# Patient Record
Sex: FEMALE | Race: BLACK OR AFRICAN AMERICAN | NOT HISPANIC OR LATINO | Employment: OTHER | URBAN - METROPOLITAN AREA
[De-identification: names, ages, dates, MRNs, and addresses within clinical notes are randomized per-mention and may not be internally consistent; named-entity substitution may affect disease eponyms.]

---

## 2017-01-12 ENCOUNTER — TELEPHONE (OUTPATIENT)
Dept: FAMILY MEDICINE CLINIC | Facility: CLINIC | Age: 67
End: 2017-01-12

## 2017-01-12 DIAGNOSIS — R92.8 ABNORMAL MAMMOGRAM: Primary | ICD-10-CM

## 2017-01-12 NOTE — TELEPHONE ENCOUNTER
Dr. Ruiz requesting bilateral ultrasound of breast  Surgery contact Dai [phone]    Megan spoke to radiology, three-way call with Yanna  She understands regarding the ultrasound and follow-up with results    Unfortunately despite great effort we are unable to reach her regarding her abnormal test in September and need for further evaluation        Abnormal mammogram September 2016, requiring further evaluation and unable to reach patient until today ,despite multiple phone calls letter's and certified mail

## 2017-06-06 ENCOUNTER — TELEPHONE (OUTPATIENT)
Dept: FAMILY MEDICINE CLINIC | Facility: CLINIC | Age: 67
End: 2017-06-06

## 2017-06-06 NOTE — TELEPHONE ENCOUNTER
Spoke with patient and she has not been contact with anyone. I advised her that it would be best if she made an appointment to come in and be seen. She is schedule for Libby 15 @ 9am

## 2017-06-07 NOTE — TELEPHONE ENCOUNTER
This message is for both of us, to make sure we have good communication  For any reason patient counseled appointment  Please notify me Libby 15 if patient does not come in to the office for any reason  Outstanding workup required regarding abnormal mammogram    Thank you

## 2017-06-15 ENCOUNTER — OFFICE VISIT (OUTPATIENT)
Dept: FAMILY MEDICINE CLINIC | Facility: CLINIC | Age: 67
End: 2017-06-15

## 2017-06-15 VITALS
OXYGEN SATURATION: 98 % | HEIGHT: 67 IN | BODY MASS INDEX: 34.06 KG/M2 | DIASTOLIC BLOOD PRESSURE: 80 MMHG | WEIGHT: 217 LBS | HEART RATE: 100 BPM | TEMPERATURE: 98.1 F | SYSTOLIC BLOOD PRESSURE: 138 MMHG

## 2017-06-15 DIAGNOSIS — M25.552 LEFT HIP PAIN: ICD-10-CM

## 2017-06-15 DIAGNOSIS — I10 ESSENTIAL HYPERTENSION: ICD-10-CM

## 2017-06-15 DIAGNOSIS — R92.8 ABNORMAL MAMMOGRAM OF BOTH BREASTS: Primary | ICD-10-CM

## 2017-06-15 DIAGNOSIS — L30.9 DERMATITIS: ICD-10-CM

## 2017-06-15 DIAGNOSIS — G89.29 CHRONIC MIDLINE LOW BACK PAIN WITHOUT SCIATICA: ICD-10-CM

## 2017-06-15 DIAGNOSIS — M54.50 CHRONIC MIDLINE LOW BACK PAIN WITHOUT SCIATICA: ICD-10-CM

## 2017-06-15 PROCEDURE — 99214 OFFICE O/P EST MOD 30 MIN: CPT | Performed by: NURSE PRACTITIONER

## 2017-06-15 RX ORDER — METHYLPREDNISOLONE 4 MG/1
TABLET ORAL
Qty: 21 TABLET | Refills: 0 | Status: SHIPPED | OUTPATIENT
Start: 2017-06-15

## 2017-06-15 RX ORDER — AMLODIPINE BESYLATE 10 MG/1
10 TABLET ORAL DAILY
Qty: 90 TABLET | Refills: 3 | Status: SHIPPED | OUTPATIENT
Start: 2017-06-15 | End: 2018-09-18 | Stop reason: SDUPTHER

## 2017-06-15 RX ORDER — DULOXETIN HYDROCHLORIDE 30 MG/1
30 CAPSULE, DELAYED RELEASE ORAL
Qty: 30 CAPSULE | Refills: 1 | Status: SHIPPED | OUTPATIENT
Start: 2017-06-15

## 2017-06-15 RX ORDER — LISINOPRIL 40 MG/1
40 TABLET ORAL DAILY
Qty: 90 TABLET | Refills: 1 | Status: SHIPPED | OUTPATIENT
Start: 2017-06-15 | End: 2018-05-24 | Stop reason: SDUPTHER

## 2017-06-15 RX ORDER — CELECOXIB 200 MG/1
200 CAPSULE ORAL DAILY
Qty: 30 CAPSULE | Refills: 5 | Status: SHIPPED | OUTPATIENT
Start: 2017-06-15

## 2017-06-15 NOTE — PROGRESS NOTES
Subjective   Yanna Montano is a 66 y.o. female.     HPI Comments: Patient's here for follow-up  She had an abnormal mammogram last fall  She avoided phone calls for quite a while think she had some anxiety and likely some depression  Likely she is here today she is known about the abnormal mammogram    She has problems sleeping  Goes asleep but wakes early    Depression screen  15  PH Q9    No bipolar disease    Blood pressures controlled    Has chronic low back pain chronic left hip pain  No history of malignancy no cancer  Limits her activities    Dermatitis rash upper extremities itches quite a bit can stop scratching  Not sun related  Had some last year on her neck now mostly her upper arms and similar for it  Someone her low back  No history psoriasis or autoimmune disease         The following portions of the patient's history were reviewed and updated as appropriate: allergies, current medications, past medical history, past social history, past surgical history and problem list.    Review of Systems   Constitutional: Positive for fever.   HENT: Negative.    Eyes: Negative.    Respiratory: Negative.    Cardiovascular: Negative.    Gastrointestinal: Negative.    Genitourinary: Negative.    Musculoskeletal: Positive for back pain.   Skin: Positive for rash.   Neurological: Negative.    Psychiatric/Behavioral: The patient is nervous/anxious.         Depression       Objective   Physical Exam   Constitutional: She is oriented to person, place, and time. She appears well-developed and well-nourished.   HENT:   Head: Normocephalic.   Nose: Nose normal.   Mouth/Throat: Oropharynx is clear and moist.   Tm clear susi no mass canal patent without d/c   Eyes: Conjunctivae are normal. Pupils are equal, round, and reactive to light. No scleral icterus.   Neck: Neck supple. No JVD present. No thyromegaly present.   Cardiovascular: Normal rate, regular rhythm and normal heart sounds.  Exam reveals no gallop and no friction  rub.    No murmur heard.  Pulmonary/Chest: Effort normal and breath sounds normal. No stridor. No respiratory distress. She has no wheezes. She has no rales.   Abdominal: Soft. Bowel sounds are normal. She exhibits no distension. There is no tenderness.   No hepatosplenomegaly, no ascites,   Musculoskeletal: She exhibits no edema or tenderness.   Lymphadenopathy:     She has no cervical adenopathy.   Neurological: She is alert and oriented to person, place, and time. She has normal reflexes.   Skin: Skin is warm and dry. No rash noted. No erythema.   Bilateral multiple excoriated papule  Approximately 10-12 each forearm obvious scratching    Dry scabs mid back without plaque or demarcation to suggest psoriasis\    Right upper forehead more discrete slightly erythemic papules     Psychiatric: She has a normal mood and affect. Her behavior is normal. Judgment and thought content normal.   Vitals reviewed.      Assessment/Plan   Yanna was seen today for follow-up and med refill.    Diagnoses and all orders for this visit:    Abnormal mammogram of both breasts  -     US breast bilateral complete    Dermatitis  Comments:  Likely eczema, or atypical psoriasis    Essential hypertension    Left hip pain  -     Ambulatory Referral to Physical Therapy  -     XR Hip With or Without Pelvis 2 - 3 View Left    Chronic midline low back pain without sciatica  -     Ambulatory Referral to Physical Therapy    Other orders  -     triamcinolone (KENALOG) 0.1 % ointment; Apply  topically 2 (Two) Times a Day. To arms, low back until clear avoid face or chronic use,  -     lisinopril (PRINIVIL,ZESTRIL) 40 MG tablet; Take 1 tablet by mouth Daily.  -     amLODIPine (NORVASC) 10 MG tablet; Take 1 tablet by mouth Daily.  -     hydrocortisone 2.5 % cream; Apply  topically 2 (Two) Times a Day. Apply sparingly to frontal scalp, upper forehead twice daily, avoid face maximum use 12 weeks.  -     MethylPREDNISolone (MEDROL, DUYEN,) 4 MG tablet;  follow package directions  -     celecoxib (CELEBREX) 200 MG capsule; Take 1 capsule by mouth Daily. With food and large glass of water daily for chronic pain  -     DULoxetine (CYMBALTA) 30 MG capsule; Take 1 capsule by mouth every night at bedtime. Chronic mild depression and anxiety                Patient agrees to get the ultrasound for breast  Follow-up with results me personally  She will see dermatology if her dermatitis does not improve  Risk-benefit duloxetine stop for any problems seek urgent treatment  Sleep hygiene    Discussed risk benefit NSAID including potential increased risk of gastric bleeding stroke heart attack elevated blood pressure kidney failure  Lowest effective dose shortest time possible, proper hydration to decrease risk of renal failure, take with food as well minimize risk.  Requires routine lab monitoring as well.  Patient accepts risk and understands risk benefit, I have answered all patient's questions thoroughly.  If gastric upset, stop the medication, if coffee-ground emesis black tarry stools abdominal pain, no immediately stop the medication and seek immediate medical attention.  If initially started on steroid, patient understands complete steroid, prior to starting any NSAID to avoid potential drug interactions.      Discharge instructions    Ointment for your arms as directed    Cream for your 4 head as directed    Duloxetine 30 mg at bedtime for anxiety depression, it may help you sleep as well?    Sleep hygiene    Physical therapy    Medrol Dosepak start now  When finished start generic Celebrex 200 mg daily for chronic aches and pains  These medications may take several weeks  2 start to notice a difference so please be patient    Please call me in one week regarding your duloxetine  I will increase the dose after 1-2 weeks    Recheck in the office in 2 weeks    And generally takes 4-6 weeks for duloxetine full effect  Reschedule your ultrasound of your breast  Encourage  you to try not to worry about this    I scheduled quite a few things that don't want to overwhelm you      Thank You,

## 2017-06-15 NOTE — PATIENT INSTRUCTIONS
Discharge instructions    Ointment for your arms as directed    Cream for your 4 head as directed    Duloxetine 30 mg at bedtime for anxiety depression, it may help you sleep as well?    Sleep hygiene    Physical therapy    Medrol Dosepak start now  When finished start generic Celebrex 200 mg daily for chronic aches and pains  These medications may take several weeks  2 start to notice a difference so please be patient    Please call me in one week regarding your duloxetine  I will increase the dose after 1-2 weeks    Recheck in the office in 2 weeks    And generally takes 4-6 weeks for duloxetine full effect  Reschedule your ultrasound of your breast  Encourage you to try not to worry about this    I scheduled quite a few things that don't want to overwhelm you      Thank You,      James Epley, NPInsomnia  Insomnia is a sleep disorder that makes it difficult to fall asleep or to stay asleep. Insomnia can cause tiredness (fatigue), low energy, difficulty concentrating, mood swings, and poor performance at work or school.   There are three different ways to classify insomnia:   · Difficulty falling asleep.  · Difficulty staying asleep.  · Waking up too early in the morning.  Any type of insomnia can be long-term (chronic) or short-term (acute). Both are common. Short-term insomnia usually lasts for three months or less. Chronic insomnia occurs at least three times a week for longer than three months.  CAUSES   Insomnia may be caused by another condition, situation, or substance, such as:  · Anxiety.  · Certain medicines.  · Gastroesophageal reflux disease (GERD) or other gastrointestinal conditions.  · Asthma or other breathing conditions.  · Restless legs syndrome, sleep apnea, or other sleep disorders.  · Chronic pain.  · Menopause. This may include hot flashes.  · Stroke.  · Abuse of alcohol, tobacco, or illegal drugs.  · Depression.  · Caffeine.    · Neurological disorders, such as Alzheimer disease.  · An  overactive thyroid (hyperthyroidism).  The cause of insomnia may not be known.  RISK FACTORS  Risk factors for insomnia include:  · Gender. Women are more commonly affected than men.  · Age. Insomnia is more common as you get older.  · Stress. This may involve your professional or personal life.  · Income. Insomnia is more common in people with lower income.  · Lack of exercise.    · Irregular work schedule or night shifts.  · Traveling between different time zones.  SIGNS AND SYMPTOMS  If you have insomnia, trouble falling asleep or trouble staying asleep is the main symptom. This may lead to other symptoms, such as:  · Feeling fatigued.  · Feeling nervous about going to sleep.  · Not feeling rested in the morning.  · Having trouble concentrating.  · Feeling irritable, anxious, or depressed.  TREATMENT   Treatment for insomnia depends on the cause. If your insomnia is caused by an underlying condition, treatment will focus on addressing the condition. Treatment may also include:   · Medicines to help you sleep.  · Counseling or therapy.  · Lifestyle adjustments.  HOME CARE INSTRUCTIONS   · Take medicines only as directed by your health care provider.  · Keep regular sleeping and waking hours. Avoid naps.  · Keep a sleep diary to help you and your health care provider figure out what could be causing your insomnia. Include:      When you sleep.    When you wake up during the night.    How well you sleep.      How rested you feel the next day.    Any side effects of medicines you are taking.    What you eat and drink.    · Make your bedroom a comfortable place where it is easy to fall asleep:    Put up shades or special blackout curtains to block light from outside.    Use a white noise machine to block noise.    Keep the temperature cool.    · Exercise regularly as directed by your health care provider. Avoid exercising right before bedtime.  · Use relaxation techniques to manage stress. Ask your health care  provider to suggest some techniques that may work well for you. These may include:    Breathing exercises.    Routines to release muscle tension.    Visualizing peaceful scenes.  · Cut back on alcohol, caffeinated beverages, and cigarettes, especially close to bedtime. These can disrupt your sleep.  · Do not overeat or eat spicy foods right before bedtime. This can lead to digestive discomfort that can make it hard for you to sleep.  · Limit screen use before bedtime. This includes:    Watching TV.    Using your smartphone, tablet, and computer.  · Stick to a routine. This can help you fall asleep faster. Try to do a quiet activity, brush your teeth, and go to bed at the same time each night.  · Get out of bed if you are still awake after 15 minutes of trying to sleep. Keep the lights down, but try reading or doing a quiet activity. When you feel sleepy, go back to bed.  · Make sure that you drive carefully. Avoid driving if you feel very sleepy.  · Keep all follow-up appointments as directed by your health care provider. This is important.  SEEK MEDICAL CARE IF:   · You are tired throughout the day or have trouble in your daily routine due to sleepiness.  · You continue to have sleep problems or your sleep problems get worse.  SEEK IMMEDIATE MEDICAL CARE IF:   · You have serious thoughts about hurting yourself or someone else.     This information is not intended to replace advice given to you by your health care provider. Make sure you discuss any questions you have with your health care provider.     Document Released: 12/15/2001 Document Revised: 09/07/2016 Document Reviewed: 09/18/2015  [employer] Interactive Patient Education ©2017 [employer]

## 2017-06-23 ENCOUNTER — APPOINTMENT (OUTPATIENT)
Dept: PHYSICAL THERAPY | Facility: HOSPITAL | Age: 67
End: 2017-06-23

## 2017-06-26 ENCOUNTER — HOSPITAL ENCOUNTER (OUTPATIENT)
Dept: ULTRASOUND IMAGING | Facility: HOSPITAL | Age: 67
Discharge: HOME | End: 2017-06-26
Admitting: NURSE PRACTITIONER

## 2017-06-26 PROCEDURE — 76641 ULTRASOUND BREAST COMPLETE: CPT

## 2017-07-23 DIAGNOSIS — R92.8 ABNORMAL MAMMOGRAM OF BOTH BREASTS: Primary | ICD-10-CM

## 2017-12-12 ENCOUNTER — HOSPITAL ENCOUNTER (EMERGENCY)
Facility: HOSPITAL | Age: 67
Discharge: HOME | End: 2017-12-12
Attending: EMERGENCY MEDICINE | Admitting: EMERGENCY MEDICINE

## 2017-12-12 ENCOUNTER — APPOINTMENT (OUTPATIENT)
Dept: GENERAL RADIOLOGY | Facility: HOSPITAL | Age: 67
End: 2017-12-12

## 2017-12-12 VITALS
OXYGEN SATURATION: 94 % | WEIGHT: 179.13 LBS | BODY MASS INDEX: 27.15 KG/M2 | TEMPERATURE: 97.8 F | HEIGHT: 68 IN | DIASTOLIC BLOOD PRESSURE: 76 MMHG | RESPIRATION RATE: 18 BRPM | SYSTOLIC BLOOD PRESSURE: 136 MMHG | HEART RATE: 103 BPM

## 2017-12-12 DIAGNOSIS — J06.9 UPPER RESPIRATORY TRACT INFECTION, UNSPECIFIED TYPE: Primary | ICD-10-CM

## 2017-12-12 LAB
FLUAV AG NPH QL: NEGATIVE
FLUBV AG NPH QL IA: NEGATIVE

## 2017-12-12 PROCEDURE — 99283 EMERGENCY DEPT VISIT LOW MDM: CPT

## 2017-12-12 PROCEDURE — 71020 HC CHEST PA AND LATERAL: CPT

## 2017-12-12 PROCEDURE — 87804 INFLUENZA ASSAY W/OPTIC: CPT | Performed by: PHYSICIAN ASSISTANT

## 2017-12-12 RX ORDER — AZITHROMYCIN 250 MG/1
250 TABLET, FILM COATED ORAL DAILY
Qty: 6 TABLET | Refills: 0 | Status: SHIPPED | OUTPATIENT
Start: 2017-12-12

## 2017-12-12 RX ADMIN — HYDROCODONE POLISTIREX AND CHLORPHENIRAMINE POLISTIREX 5 ML: 10; 8 SUSPENSION, EXTENDED RELEASE ORAL at 17:10

## 2017-12-12 NOTE — ED PROVIDER NOTES
EMERGENCY DEPARTMENT ENCOUNTER    CHIEF COMPLAINT  Chief Complaint: cough  History given by: patient  History limited by: nothing  Room Number: 08/08  PMD: James Epley, APRN      HPI:  Pt is a 67 y.o. female who presents complaining of a dry cough for the last 3 days. Pt states that her cough is worse with lying supine. Pt denies N/V/D but complains of ear aches, HA and sore throat. Pt states that she did get a flu shot this year.     Duration:  3 days  Onset: gradual  Timing: intermittent  Location: N/A  Radiation: N/A  Quality: dry  Intensity/Severity: moderate  Progression: worsening  Associated Symptoms: ear aches, HA, sore throat  Aggravating Factors: lying supine  Alleviating Factors: none  Previous Episodes: none  Treatment before arrival: none    PAST MEDICAL HISTORY  Active Ambulatory Problems     Diagnosis Date Noted   • Arthritis 07/26/2016   • Arthralgia of hip 07/26/2016   • BP (high blood pressure) 07/26/2016   • Gonalgia 07/26/2016   • LBP (low back pain) 07/26/2016   • Chronic low back pain 07/26/2016   • Colon cancer screening 07/26/2016   • Breast cancer screening 07/26/2016   • Contact dermatitis 07/26/2016   • Encounter for screening mammogram for malignant neoplasm of breast  07/26/2016   • Right lumbar radiculopathy 07/26/2016   • Postmenopausal 07/26/2016   • Abnormal mammogram of both breasts 06/15/2017   • Left hip pain 06/15/2017   • Dermatitis 06/15/2017   • Chronic midline low back pain without sciatica 06/15/2017     Resolved Ambulatory Problems     Diagnosis Date Noted   • No Resolved Ambulatory Problems     Past Medical History:   Diagnosis Date   • Arthritis    • Hypertension        PAST SURGICAL HISTORY  History reviewed. No pertinent surgical history.    FAMILY HISTORY  History reviewed. No pertinent family history.    SOCIAL HISTORY  Social History     Social History   • Marital status:      Spouse name: N/A   • Number of children: N/A   • Years of education: N/A      Occupational History   • Not on file.     Social History Main Topics   • Smoking status: Current Every Day Smoker   • Smokeless tobacco: Never Used   • Alcohol use Yes   • Drug use: No   • Sexual activity: Not on file     Other Topics Concern   • Not on file     Social History Narrative       ALLERGIES  Review of patient's allergies indicates no known allergies.    REVIEW OF SYSTEMS  Review of Systems   Constitutional: Negative for fever.   HENT: Positive for ear pain and sore throat.    Eyes: Negative.    Respiratory: Positive for cough (dry). Negative for shortness of breath.    Cardiovascular: Negative for chest pain.   Gastrointestinal: Negative for abdominal pain, diarrhea and vomiting.   Genitourinary: Negative for dysuria.   Musculoskeletal: Negative for neck pain.   Skin: Negative for rash.   Allergic/Immunologic: Negative.    Neurological: Positive for headaches. Negative for weakness and numbness.   Hematological: Negative.    Psychiatric/Behavioral: Negative.    All other systems reviewed and are negative.      PHYSICAL EXAM  ED Triage Vitals   Temp Heart Rate Resp BP SpO2   12/12/17 1400 12/12/17 1400 12/12/17 1400 12/12/17 1400 12/12/17 1400   97.8 °F (36.6 °C) 103 18 136/76 94 %      Temp src Heart Rate Source Patient Position BP Location FiO2 (%)   -- -- -- -- --              Physical Exam   Constitutional: She is oriented to person, place, and time and well-developed, well-nourished, and in no distress. No distress.   HENT:   Head: Normocephalic and atraumatic.   Right Ear: Tympanic membrane normal.   Left Ear: Tympanic membrane normal.   Mouth/Throat: Oropharynx is clear and moist.   Eyes: EOM are normal. Pupils are equal, round, and reactive to light.   Neck: Normal range of motion. Neck supple.   Cardiovascular: Normal rate, regular rhythm and normal heart sounds.    Pulmonary/Chest: Effort normal and breath sounds normal. No respiratory distress. She has no wheezes. She has no rales.   Pt is  actively coughing   Abdominal: Soft. There is no tenderness. There is no rebound and no guarding.   Musculoskeletal: Normal range of motion. She exhibits no edema.   Lymphadenopathy:     She has no cervical adenopathy.   Neurological: She is alert and oriented to person, place, and time. She has normal sensation and normal strength.   Skin: Skin is warm and dry. No rash noted.   Psychiatric: Mood and affect normal.   Nursing note and vitals reviewed.      LAB RESULTS  Lab Results (last 24 hours)     Procedure Component Value Units Date/Time    Influenza Antigen, Rapid - Swab, Nasopharynx [[id number]]  (Normal) Collected:  12/12/17 1558    Specimen:  Swab from Nasopharynx Updated:  12/12/17 1628     Influenza A Ag, EIA Negative     Influenza B Ag, EIA Negative          I ordered the above labs and reviewed the results    RADIOLOGY  XR Chest 2 View   Final Result         Reviewed pt's CXR, which shows a large hiatal hernia but nothing acute. Independently viewed by me. Interpreted by radiologist.     I ordered the above noted radiological studies. Interpreted by radiologist. Reviewed by me in PACS.       PROCEDURES  Procedures      PROGRESS AND CONSULTS  ED Course     1612- Notified pt of her CXR result and that I am still waiting on the results of her flu swab.    1613- Ordered tussionex for cough.  1738- Rechecked pt. Pt is resting comfortably. Notified pt of her negative influenza swab. Discussed the plan to discharge the pt home with a prescription for cough medication and instructed the pt to follow up with her PCP. Pt agrees with the plan and all questions were addressed.    MEDICAL DECISION MAKING  Results were reviewed/discussed with the patient and they were also made aware of online access. Pt also made aware that some labs, such as cultures, will not be resulted during ER visit and follow up with PMD is necessary.     MDM  Number of Diagnoses or Management Options     Amount and/or Complexity of Data  Reviewed  Clinical lab tests: ordered and reviewed (Influenza swab is negative)  Tests in the radiology section of CPT®: ordered and reviewed (CXR shows nothing acute)  Decide to obtain previous medical records or to obtain history from someone other than the patient: yes  Review and summarize past medical records: yes (Pt was last admitted in January 2017 )  Independent visualization of images, tracings, or specimens: yes    Patient Progress  Patient progress: stable         DIAGNOSIS  Final diagnoses:   Upper respiratory tract infection, unspecified type       DISPOSITION  DISCHARGE    Patient discharged in stable condition.    Reviewed implications of results, diagnosis, meds, responsibility to follow up, warning signs and symptoms of possible worsening, potential complications and reasons to return to ER, including fever, worsening pain or any concerns.    Patient/Family voiced understanding of above instructions.    Discussed plan for discharge, as there is no emergent indication for admission.  Pt/family is agreeable and understands need for follow up and repeat testing.  Pt is aware that discharge does not mean that nothing is wrong but it indicates no emergency is present that requires admission and they must continue care with follow-up as given below or physician of their choice.     FOLLOW-UP  James Epley, WILNER  [address]  [place] [id number] [phone]    Call  As needed, If symptoms worsen         Medication List      New Prescriptions          azithromycin 250 MG tablet   Commonly known as:  ZITHROMAX   Take 1 tablet by mouth Daily. Take 2 tablets the first day, then 1 tablet   daily for 4 days.       HYDROcod Polst-CPM Polst ER 10-8 MG/5ML ER suspension   Commonly known as:  TUSSIONEX PENNKINETIC ER   Take 5 mL by mouth Every 12 (Twelve) Hours As Needed for Cough.         Stop          celecoxib 200 MG capsule   Commonly known as:  CELEBREX       DULoxetine 30 MG capsule    Commonly known as:  CYMBALTA       hydrocortisone 2.5 % cream       MethylPREDNISolone 4 MG tablet   Commonly known as:  MEDROL (DUYEN)       triamcinolone 0.1 % ointment   Commonly known as:  KENALOG               Latest Documented Vital Signs:  As of 5:41 PM  BP- 136/76 HR- 103 Temp- 97.8 °F (36.6 °C) O2 sat- 94%    --  Documentation assistance provided by renee Craft for Dr. Rinaldi.  Information recorded by the scribe was done at my direction and has been verified and validated by me.     Rachana Craft  12/12/17 0769       Abel Rinaldi MD  12/12/17 3572

## 2017-12-12 NOTE — ED NOTES
"Pt reports \"flu like symptoms\" since Sunday. Describes cough, congestion, fever/chills, sore throat, denies vomiting or diarrhea.       Larissa Masters RN  12/12/17 1400    "

## 2017-12-14 ENCOUNTER — TELEPHONE (OUTPATIENT)
Dept: SOCIAL WORK | Facility: HOSPITAL | Age: 67
End: 2017-12-14

## 2018-05-24 RX ORDER — LISINOPRIL 40 MG/1
40 TABLET ORAL DAILY
Qty: 90 TABLET | Refills: 0 | Status: SHIPPED | OUTPATIENT
Start: 2018-05-24 | End: 2018-09-18 | Stop reason: SDUPTHER

## 2018-09-17 RX ORDER — AMLODIPINE BESYLATE 10 MG/1
10 TABLET ORAL DAILY
Qty: 90 TABLET | Refills: 0 | OUTPATIENT
Start: 2018-09-17

## 2018-09-17 RX ORDER — LISINOPRIL 40 MG/1
40 TABLET ORAL DAILY
Qty: 90 TABLET | Refills: 0 | OUTPATIENT
Start: 2018-09-17

## 2018-09-18 RX ORDER — LISINOPRIL 40 MG/1
40 TABLET ORAL DAILY
Qty: 90 TABLET | Refills: 0 | Status: SHIPPED | OUTPATIENT
Start: 2018-09-18

## 2018-09-18 RX ORDER — AMLODIPINE BESYLATE 10 MG/1
10 TABLET ORAL DAILY
Qty: 90 TABLET | Refills: 0 | Status: SHIPPED | OUTPATIENT
Start: 2018-09-18

## 2023-09-22 ENCOUNTER — READMISSION MANAGEMENT (OUTPATIENT)
Dept: CALL CENTER | Facility: HOSPITAL | Age: 73
End: 2023-09-22
Payer: MEDICARE

## 2023-09-22 NOTE — OUTREACH NOTE
Prep Survey      Flowsheet Row Responses   [facility] facility patient discharged from? Non-[facility]   Is LACE score < 7 ? Non-BH Discharge   Eligibility [facility]   Date of Admission 09/20/23   Date of Discharge 09/21/23   Discharge diagnosis Acute exacerbation of chronic obstructive pulmonary disease   Does the patient have one of the following disease processes/diagnoses(primary or secondary)? COPD   Does the patient have Home health ordered? Yes   What is the Home health agency?  home with [facility]   Prep survey completed? Yes            Roxie IVORY - Registered Nurse

## 2023-09-25 ENCOUNTER — TRANSITIONAL CARE MANAGEMENT TELEPHONE ENCOUNTER (OUTPATIENT)
Dept: CALL CENTER | Facility: HOSPITAL | Age: 73
End: 2023-09-25

## 2023-09-25 NOTE — OUTREACH NOTE
Call Center TCM Note      Flowsheet Row Responses   [facility] patient discharged from? Non-   Does the patient have one of the following disease processes/diagnoses(primary or secondary)? COPD   TCM attempt successful? Yes   Call start time 1330   Call end time 1331   Discharge diagnosis Acute exacerbation of chronic obstructive pulmonary disease   Meds reviewed with patient/caregiver? Yes   Is the patient having any side effects they believe may be caused by any medication additions or changes? No   Does the patient have all medications ordered at discharge? Yes   Is the patient taking all medications as directed (includes completed medication regime)? Yes   Does the patient have an appointment with their PCP within 7-14 days of discharge? No   Nursing Interventions Patient declined scheduling/rescheduling appointment at this time, Routed TCM call to PCP office, PCP office requested to make appointment - message sent   What is the Home health agency?  home with [facility]   Home health comments  has visited pt   Psychosocial issues? No   Did the patient receive a copy of their discharge instructions? Yes   Nursing interventions Reviewed instructions with patient   What is the patient's perception of their health status since discharge? Improving   Is the patient/caregiver able to teach back signs and symptoms related to disease process for when to call PCP? Yes   Is the patient/caregiver able to teach back signs and symptoms related to disease process for when to call 911? Yes   Is the patient/caregiver able to teach back the hierarchy of who to call/visit for symptoms/problems? PCP, Specialist, Home health nurse, Urgent Care, ED, 911 Yes   If the patient is a current smoker, are they able to teach back resources for cessation? Not a smoker   TCM call completed? Yes   Call end time 1331            Maria Luz Horta RN    9/25/2023, 13:31 EDT